# Patient Record
Sex: FEMALE | Race: WHITE | URBAN - METROPOLITAN AREA
[De-identification: names, ages, dates, MRNs, and addresses within clinical notes are randomized per-mention and may not be internally consistent; named-entity substitution may affect disease eponyms.]

---

## 2022-11-10 ENCOUNTER — APPOINTMENT (OUTPATIENT)
Dept: URBAN - METROPOLITAN AREA CLINIC 234 | Age: 77
Setting detail: DERMATOLOGY
End: 2022-11-14

## 2022-11-10 VITALS — HEIGHT: 63 IN | WEIGHT: 125 LBS

## 2022-11-10 DIAGNOSIS — Z79.899 OTHER LONG TERM (CURRENT) DRUG THERAPY: ICD-10-CM

## 2022-11-10 DIAGNOSIS — L57.8 OTHER SKIN CHANGES DUE TO CHRONIC EXPOSURE TO NONIONIZING RADIATION: ICD-10-CM

## 2022-11-10 DIAGNOSIS — L30.9 DERMATITIS, UNSPECIFIED: ICD-10-CM

## 2022-11-10 DIAGNOSIS — L82.0 INFLAMED SEBORRHEIC KERATOSIS: ICD-10-CM

## 2022-11-10 DIAGNOSIS — Z71.89 OTHER SPECIFIED COUNSELING: ICD-10-CM

## 2022-11-10 PROCEDURE — OTHER HIGH RISK MEDICATION MONITORING: OTHER

## 2022-11-10 PROCEDURE — OTHER LIQUID NITROGEN: OTHER

## 2022-11-10 PROCEDURE — 99204 OFFICE O/P NEW MOD 45 MIN: CPT | Mod: 25

## 2022-11-10 PROCEDURE — OTHER PRESCRIPTION: OTHER

## 2022-11-10 PROCEDURE — OTHER ADDITIONAL NOTES: OTHER

## 2022-11-10 PROCEDURE — 17110 DESTRUCT B9 LESION 1-14: CPT

## 2022-11-10 PROCEDURE — OTHER COUNSELING: OTHER

## 2022-11-10 PROCEDURE — OTHER MIPS QUALITY: OTHER

## 2022-11-10 RX ORDER — TRIAMCINOLONE ACETONIDE 0.25 MG/G
CREAM TOPICAL
Qty: 80 | Refills: 2 | Status: ERX | COMMUNITY
Start: 2022-11-10

## 2022-11-10 ASSESSMENT — LOCATION DETAILED DESCRIPTION DERM
LOCATION DETAILED: RIGHT INFERIOR CENTRAL MALAR CHEEK
LOCATION DETAILED: LEFT ELBOW
LOCATION DETAILED: LEFT SUPERIOR MEDIAL BUCCAL CHEEK

## 2022-11-10 ASSESSMENT — LOCATION ZONE DERM
LOCATION ZONE: ARM
LOCATION ZONE: FACE

## 2022-11-10 ASSESSMENT — LOCATION SIMPLE DESCRIPTION DERM
LOCATION SIMPLE: RIGHT CHEEK
LOCATION SIMPLE: LEFT CHEEK
LOCATION SIMPLE: LEFT ELBOW

## 2022-11-10 NOTE — PROCEDURE: MIPS QUALITY
Quality 130: Documentation Of Current Medications In The Medical Record: Current Medications Documented
Quality 226: Preventive Care And Screening: Tobacco Use: Screening And Cessation Intervention: Patient screened for tobacco use and is an ex/non-smoker
Quality 111:Pneumonia Vaccination Status For Older Adults: Pneumococcal vaccine (PPSV23) administered on or after patient’s 60th birthday and before the end of the measurement period
Quality 431: Preventive Care And Screening: Unhealthy Alcohol Use - Screening: Patient not identified as an unhealthy alcohol user when screened for unhealthy alcohol use using a systematic screening method
Quality 110: Preventive Care And Screening: Influenza Immunization: Influenza Immunization not Administered because Patient Refused.
Detail Level: Detailed

## 2022-11-10 NOTE — HPI: SKIN LESIONS
How Severe Is Your Skin Lesion?: mild
Have Your Skin Lesions Been Treated?: been treated
Is This A New Presentation, Or A Follow-Up?: Skin Lesions
When Was It Treated?: 8/22

## 2022-11-10 NOTE — PROCEDURE: ADDITIONAL NOTES
Detail Level: Simple
Render Risk Assessment In Note?: no
Additional Notes: Skin breakdown, pt rest / uses elbows often

## 2022-11-10 NOTE — PROCEDURE: LIQUID NITROGEN
Show Spray Paint Technique Variable?: Yes
Medical Necessity Clause: This procedure was medically necessary because the lesions that were treated were:
Post-Care Instructions: I reviewed with the patient in detail post-care instructions. Patient is to wear sunprotection, and avoid picking at any of the treated lesions. Pt may apply Vaseline to crusted or scabbing areas.
Spray Paint Text: The liquid nitrogen was applied to the skin utilizing a spray paint frosting technique.
Detail Level: Detailed
Render Note In Bullet Format When Appropriate: No
Medical Necessity Information: It is in your best interest to select a reason for this procedure from the list below. All of these items fulfill various CMS LCD requirements except the new and changing color options.
Consent: The patient's consent was obtained including but not limited to risks of crusting, scabbing, blistering, scarring, darker or lighter pigmentary change, recurrence, incomplete removal and infection.

## 2022-11-10 NOTE — PROCEDURE: HIGH RISK MEDICATION MONITORING
Xelmoiraz Pregnancy And Lactation Text: This medication is Pregnancy Category D and is not considered safe during pregnancy.  The risk during breast feeding is also uncertain.

## 2023-02-17 ENCOUNTER — APPOINTMENT (OUTPATIENT)
Dept: URBAN - METROPOLITAN AREA CLINIC 234 | Age: 78
Setting detail: DERMATOLOGY
End: 2023-02-20

## 2023-02-17 DIAGNOSIS — L30.9 DERMATITIS, UNSPECIFIED: ICD-10-CM

## 2023-02-17 PROCEDURE — OTHER ADDITIONAL NOTES: OTHER

## 2023-02-17 PROCEDURE — 99212 OFFICE O/P EST SF 10 MIN: CPT

## 2023-02-17 PROCEDURE — OTHER COUNSELING: OTHER

## 2023-02-17 PROCEDURE — OTHER MIPS QUALITY: OTHER

## 2023-02-17 ASSESSMENT — LOCATION SIMPLE DESCRIPTION DERM: LOCATION SIMPLE: LEFT ELBOW

## 2023-02-17 ASSESSMENT — LOCATION ZONE DERM: LOCATION ZONE: ARM

## 2023-02-17 ASSESSMENT — LOCATION DETAILED DESCRIPTION DERM: LOCATION DETAILED: LEFT ELBOW

## 2023-02-17 NOTE — PROCEDURE: ADDITIONAL NOTES
Detail Level: Detailed
Additional Notes: Reports topical steroids did not improve her condition. However, she has been using roll on otc penetrex, arnica is active ingredient. She states she seen significant improvement with it. Recommended continuing on with that product. She can call for follow up if she feels like she needs more help.
Render Risk Assessment In Note?: no
Home

## 2023-02-17 NOTE — PROCEDURE: MIPS QUALITY
Detail Level: Detailed
Quality 226: Preventive Care And Screening: Tobacco Use: Screening And Cessation Intervention: Patient screened for tobacco use, is a smoker AND received Cessation Counseling within the Previous 12 Months
Quality 130: Documentation Of Current Medications In The Medical Record: Current Medications Documented
Quality 431: Preventive Care And Screening: Unhealthy Alcohol Use - Screening: Patient not identified as an unhealthy alcohol user when screened for unhealthy alcohol use using a systematic screening method
Quality 110: Preventive Care And Screening: Influenza Immunization: Influenza immunization was not ordered or administered, reason not given
Quality 111:Pneumonia Vaccination Status For Older Adults: Pneumococcal vaccine (PPSV23) administered on or after patient’s 60th birthday and before the end of the measurement period

## 2024-03-05 NOTE — PROCEDURE: HIGH RISK MEDICATION MONITORING
Patient: Etta Gerard    [id number]  : 1959 -- AGE 64 y.o.    Provider: Sheri Cuevas CNP     Location [facility]   Service Date:   3/19/2024            [facility]  New Visit Note      HISTORY OF PRESENT ILLNESS     The patient's referring provider is:Dr Lira, previosly seen by Miko    HISTORY OF PRESENT ILLNESS   Etta Gerard is a 64 y.o. female who presents to a [facility] for an evaluation with concerns of No chief complaint on file.. I have independently interviewed and examined the patient in the office and reviewed available records.    Current History  3/19/2024    On today's visit, the patient reports she started breztri and she could breathe a lot better. She could breathe in better, She clears her throat a lot but does not cough. Her shortness of breath is better, she is not gasping for air. A little wheezing. Denies Fevers/chills chest pain or GERD,    She wears oxygen at night.  She has brought in her oxygen mask to show which is better. She has a dog who bites everyone and she does not have anyone to watch her dog  Allergies denies runny nose or post nasal drip   GERD denies   ED visits denies   Up to date on vaccines - did not get Flu vaccine or COVID booster  Night time - has cough at night  C/o unrested sleep.   She did not use Breztri today        Current History 2024     On today's visit, the patient reports she wears 3 liters while sleeping. No am headaches. She has restful sleep, no daytime sleepiness. She had CPAP for ABILIO and she lost 100lbs and was able to taper down to nocturnal oxygenation via cannula. Now she has gained 70lbs due to smoking cessation.   At baseline,  has dyspnea on exertion, but  none at rest, but conversational dyspnea. Symptoms started many years ago, but has mostly been stable.  Currently sits for most of the day, works inside the house, but does not carry loads  and do strenuous exercise.   Has to stop for breath after walking about 100 meters or a few minutes (mMRC 3).   Relates orthopnea, denies pnd/aidan.  Has gained X 40 pounds in the last X 12 months, since stopped smoking 11 months ago, used patch, nicotine gum.  Improving chronic cough with clear phlegm, C/o  wheezing, and denies, green, blood streaks. No night cough. No hemoptysis. No fever or shivering chills. Has no runny nose, or a tingling sensation in the back of his throat.  Denies chest pain or heartburn.     Previous pulmonary history:  no history of recurrent infections, or lung disease as a child.  No previous lung hx, never on oxygen or inhaler therapy.    previously was told they have asthma.  currently is on 2-3 liters at night supplemental oxygen. No exacerbations this year    Inhalers/nebulized medications: stiolto - been on 5 years and using albuterol 4 times a day consistently    Hospitalization History: Not been hospitalized over the last year for breathing related problem.    Sleep history:  Complains of snoring, has dreams about apnea, feeling tired during the day, and taking naps during the day.   She was tested and used CPAP. She lost 100 lbs then improved.     ALLERGIES AND MEDICATIONS     ALLERGIES  Allergies   Allergen Reactions    Codeine Unknown    Prednisolone Other    Prednisone Unknown       MEDICATIONS  Current Outpatient Medications   Medication Sig Dispense Refill    atorvastatin (Lipitor) 40 mg tablet Take 1 tablet (40 mg) by mouth once daily.      atorvastatin (Lipitor) 40 mg tablet TAKE 1 TABLET BY MOUTH DAILY 90 tablet 0    budesonide-glycopyr-formoterol (Breztri Aerosphere) 160-9-4.8 mcg/actuation HFA aerosol inhaler Inhale 2 puffs 2 times a day. Rinse mouth with water after use to reduce aftertaste and incidence of candidiasis. Do not swallow. 10.7 g 3    ergocalciferol (Vitamin D-2) 1.25 MG (56747 UT) capsule Take 1 capsule (1,250 mcg) by mouth 1 (one) time per week.       levothyroxine (Synthroid, Levoxyl) 100 mcg tablet TAKE 1 TABLET BY MOUTH DAILY 90 tablet 0    meloxicam (Mobic) 15 mg tablet Take 1 tablet (15 mg) by mouth once daily. 90 tablet 1    Stiolto Respimat 2.5-2.5 mcg/actuation mist inhaler Inhale 1 Inhalation 2 times a day.      Vitamin D2 1,250 mcg (50,000 unit) capsule TAKE 1 CAPSULE BY MOUTH WEEKLY 13 capsule 0     No current facility-administered medications for this visit.         PAST HISTORY     PAST MEDICAL HISTORY  She  has a past medical history of Personal history of malignant neoplasm, unspecified.hypothyroidism HLD, endometrial cancer    PAST SURGICAL HISTORY  Past Surgical History:   Procedure Laterality Date    OTHER SURGICAL HISTORY  05/20/2019    Colon surgery    OTHER SURGICAL HISTORY  05/20/2019    Hysterectomy    OTHER SURGICAL HISTORY  05/20/2019    Foot surgery    OTHER SURGICAL HISTORY  05/21/2019    Tonsillectomy    OTHER SURGICAL HISTORY  05/21/2019    Cholecystectomy    OTHER SURGICAL HISTORY  01/16/2022    Tibia fracture repair       IMMUNIZATION HISTORY  Immunization History   Administered Date(s) Administered    Pfizer Purple Cap SARS-CoV-2 07/22/2021, 08/12/2021       SOCIAL HISTORY  She  reports that she quit smoking about 14 months ago. Her smoking use included cigarettes. She has a 92.00 pack-year smoking history. She has never used smokeless tobacco. She reports that she does not currently use alcohol. She reports that she does not use drugs. She Patient     OCCUPATIONAL/ENVIRONMENTAL HISTORY  Previously worked as:    DOES/DOES NOT EC: does not have known exposure to asbestos, silica, beryllium or inhaled metals.  DOES/DOES NOT EC: does have exposure to birds or exotic animals. 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  DOES/DOES NOT EC: does have a family history of cancer.  DOES/DOES NOT EC: does not have a family history of autoimmune disorders.    RESULTS/DATA     Pulmonary Function Test Results     3/15/2024  Normal spirometry. There is no significant bronchodilator response. Lung volumes are normal. Air trapping is present.The DLCO corrected for hemoglobin is normal.6 minute walk test: patient walked 305 meters, no significant desaturation on room air     Chest Radiograph     XR CHEST 1 VIEW 11/22/2019      COMPARISON:  Chest x-ray 11/27/2018, 11/25/2018.      FINDINGS:  The cardiomediastinal silhouette is within normal limits.  There is  no overt vascular congestion.    No focal consolidation, pleural effusion or pneumothorax.    Impression  No acute radiographic finding in the chest.      Chest CT Scan     CT chest 2/27/24: IMPRESSION:  1. 10 mm nodular density in the right lung base, located along a  bandlike opacity and likely a more focal area of atelectasis or  scarring. Recommend however short-term follow-up chest CT in 3 months  to confirm stability or improvement  2. Additional scattered pulmonary nodules measuring up to 5 mm  predominantly in the right lung as described above.  3. Mild upper lung predominant emphysema.  4. Small hiatal hernia.  5. Mild coronary artery calcification. Estimated coronary artery  calcium score 40.  6. Upper abdominal findings as described above.      Echocardiogram     No testing done         REVIEW OF SYSTEMS     REVIEW OF SYSTEMS  Review of Systems   Respiratory:  Positive for cough and shortness of breath.    Cardiovascular:  Positive for palpitations.   Musculoskeletal:  Positive for arthralgias.   All other systems reviewed and are negative.        PHYSICAL EXAM     VITAL SIGNS: There were no vitals taken  "for this visit. /79   Pulse 103   Temp 36.2 °C (97.1 °F) (Temporal)   Resp 20   Ht 1.676 m (5' 6\")   Wt 124 kg (272 lb 6.4 oz)   BMI 43.97 kg/m²      CURRENT WEIGHT: [unfilled]  BMI: [unfilled]  PREVIOUS WEIGHTS:  Wt Readings from Last 3 Encounters:   02/05/24 126 kg (277 lb 6.4 oz)   09/12/23 121 kg (266 lb)   07/13/23 118 kg (260 lb)       Physical Exam  Constitutional:       Appearance: Normal appearance.   HENT:      Head: Normocephalic and atraumatic.      Right Ear: External ear normal.      Left Ear: External ear normal.      Nose: Nose normal.      Mouth/Throat:      Mouth: Mucous membranes are moist.      Pharynx: Oropharynx is clear.   Eyes:      Extraocular Movements: Extraocular movements intact.      Conjunctiva/sclera: Conjunctivae normal.      Pupils: Pupils are equal, round, and reactive to light.   Cardiovascular:      Rate and Rhythm: Normal rate and regular rhythm.      Pulses: Normal pulses.      Heart sounds: Normal heart sounds.   Pulmonary:      Effort: Pulmonary effort is normal.      Breath sounds: Normal breath sounds.   Abdominal:      General: Bowel sounds are normal.      Palpations: Abdomen is soft.   Musculoskeletal:         General: Normal range of motion.      Cervical back: Normal range of motion and neck supple.   Skin:     General: Skin is warm and dry.   Neurological:      General: No focal deficit present.      Mental Status: She is alert and oriented to person, place, and time. Mental status is at baseline.   Psychiatric:         Mood and Affect: Mood normal.         Behavior: Behavior normal.         Thought Content: Thought content normal.         Judgment: Judgment normal.         ASSESSMENT/PLAN     Ms. Gerard is a 64 y.o. female and  has a past medical history of Personal history of malignant neoplasm, unspecified. She was referred to the [facility] for evaluation of asthma/COPD    Problem List and Orders      Assessment and " Plan / Recommendations:  Problem List Items Addressed This Visit    None          COPD/asthma  3/15/2024 pulmonary function test  with Normal spirometry. There is no significant bronchodilator response. Lung volumes are normal. Air trapping is present.The DLCO corrected for hemoglobin is normal.6 minute walk test: patient walked 305 meters, no significant desaturation on room   6 minute walk test no desaturation   FENO 6  albuterol hfa 2 puffs or albuterol nebs every 4-6 hours as needed  - symptoms improving with breztri, cont breztri 2 puffs twice a day, rinse mouth afterwards, since needing albuterol  4 times a day, refilled breztri  - will check Ig E, RAST panel and CBC with diff - due to asthma (her father had allergic asthma) - did not obtain     Tobacco abuse - stopped smoking 1 year ago   Given the duration of their cigarettes smoking they meet the eligibility criteria for lung cancer screening with a low-dose CT scan. We discussed the risks and benefits of screening. We discussed the importance of adhering to annual lung cancer screening with this method, the impact of comorbidities, and their ability or willingness to undergo diagnosis and treatment if abnormalities are discovered.    2/27 CT chest revealed  10 mm nodular density in the right lung base, located along a bandlike opacity and likely a more focal area of atelectasis or scarring. Recommend however short-term follow-up chest CT in 3 months to confirm stability or improvement. 2. Additional scattered pulmonary nodules measuring up to 5 mm predominantly in the right lung as described above. 3. Mild upper lung predominant emphysema.  4. Small hiatal hernia.  - repeat CT chest in 3 months if 10mm Right lung base growing will proceed with bronchoscopy    ABILIO - past history when patient was at this weight, tapered down to nocturnal O2 with weight loss. Now due to weight gain will repeat in lab sleep study- sleep schedule not completed     Hypoxia:  Presents to clinic today with O2 sat _90__% on room air. Oxywalk completed. _90-93% ___% ambulating on room air.    Thank you for visiting the Pulmonary clinic today!       Return to clinic after __12_weeks and after  CT scan and sleep study complete  or sooner if needed   Sheri Cuevas CNP  My office number is [phone] -     Any test results will be discussed at next visit -- please make sure to make a follow up appt after testing.      Oral Minoxidil Counseling- I discussed with the patient the risks of oral minoxidil including but not limited to shortness of breath, swelling of the feet or ankles, dizziness, lightheadedness, unwanted hair growth and allergic reaction.  The patient verbalized understanding of the proper use and possible adverse effects of oral minoxidil.  All of the patient's questions and concerns were addressed.

## 2024-05-14 NOTE — PROCEDURE: HIGH RISK MEDICATION MONITORING
- s/p kidney transplant (May 2023)  - cont prograf, monitoring daily levels.   - cont prednisone  - Hold MMF  - KTM following     Azathioprine Pregnancy And Lactation Text: This medication is Pregnancy Category D and isn't considered safe during pregnancy. It is unknown if this medication is excreted in breast milk.